# Patient Record
Sex: MALE | Race: WHITE | Employment: FULL TIME | URBAN - METROPOLITAN AREA
[De-identification: names, ages, dates, MRNs, and addresses within clinical notes are randomized per-mention and may not be internally consistent; named-entity substitution may affect disease eponyms.]

---

## 2017-12-04 ENCOUNTER — OFFICE VISIT (OUTPATIENT)
Dept: INTERNAL MEDICINE CLINIC | Age: 47
End: 2017-12-04

## 2017-12-04 VITALS
BODY MASS INDEX: 30.08 KG/M2 | DIASTOLIC BLOOD PRESSURE: 86 MMHG | HEART RATE: 50 BPM | WEIGHT: 260 LBS | TEMPERATURE: 97.9 F | HEIGHT: 78 IN | OXYGEN SATURATION: 98 % | SYSTOLIC BLOOD PRESSURE: 136 MMHG

## 2017-12-04 DIAGNOSIS — J02.9 SORE THROAT: Primary | ICD-10-CM

## 2017-12-04 PROCEDURE — 99213 OFFICE O/P EST LOW 20 MIN: CPT | Performed by: INTERNAL MEDICINE

## 2017-12-04 RX ORDER — AMOXICILLIN 250 MG/1
250 CAPSULE ORAL 3 TIMES DAILY
Qty: 30 CAPSULE | Refills: 0 | Status: SHIPPED | OUTPATIENT
Start: 2017-12-04 | End: 2017-12-14

## 2017-12-04 ASSESSMENT — ENCOUNTER SYMPTOMS
SORE THROAT: 1
RESPIRATORY NEGATIVE: 1
EYES NEGATIVE: 1
GASTROINTESTINAL NEGATIVE: 1

## 2017-12-04 NOTE — PROGRESS NOTES
Subjective:      Patient ID: Abiel Muñoz is a 41 y.o. male. HPI         Here 3.5 years ago       Sore throat x 2 weeks    Worried abou strep    No fevers, chills,rigors  Or other signs of systemic illness   Besides what  is already noted. Then he says  Has    Some mild bronchitis    Review of Systems   Constitutional: Negative. HENT: Positive for sore throat. Eyes: Negative. Respiratory: Negative. Cardiovascular: Negative. Gastrointestinal: Negative. Endocrine: Negative. Objective:   Physical Exam   Constitutional: He appears well-developed and well-nourished. HENT:   Head: Normocephalic and atraumatic. Eyes: Conjunctivae and EOM are normal.   Cardiovascular: Normal rate, regular rhythm and normal heart sounds. Pulmonary/Chest: Effort normal. No respiratory distress. He has no wheezes. He has no rales. Musculoskeletal: He exhibits no edema. Vitals reviewed. Assessment:      Phoebe Heck was seen today for pharyngitis. Diagnoses and all orders for this visit:    Sore throat         Continue with rest, fluids and tylenol as needed     Culture and amoxil     chloreseptoic    probb  viral      Plan: Ilsa Clarke

## 2017-12-06 LAB — THROAT CULTURE: NORMAL

## 2017-12-22 ENCOUNTER — OFFICE VISIT (OUTPATIENT)
Dept: INTERNAL MEDICINE CLINIC | Age: 47
End: 2017-12-22

## 2017-12-22 VITALS
OXYGEN SATURATION: 98 % | HEIGHT: 78 IN | WEIGHT: 258.6 LBS | BODY MASS INDEX: 29.92 KG/M2 | SYSTOLIC BLOOD PRESSURE: 118 MMHG | HEART RATE: 61 BPM | DIASTOLIC BLOOD PRESSURE: 74 MMHG

## 2017-12-22 DIAGNOSIS — L98.9 LESION OF SKIN OF FACE: ICD-10-CM

## 2017-12-22 DIAGNOSIS — Z00.00 ANNUAL PHYSICAL EXAM: Primary | ICD-10-CM

## 2017-12-22 DIAGNOSIS — J06.9 UPPER RESPIRATORY TRACT INFECTION, UNSPECIFIED TYPE: ICD-10-CM

## 2017-12-22 DIAGNOSIS — Z23 NEEDS FLU SHOT: ICD-10-CM

## 2017-12-22 DIAGNOSIS — L98.9 CHEST SKIN LESION: ICD-10-CM

## 2017-12-22 PROCEDURE — 99396 PREV VISIT EST AGE 40-64: CPT | Performed by: INTERNAL MEDICINE

## 2017-12-22 ASSESSMENT — ENCOUNTER SYMPTOMS
SORE THROAT: 1
RESPIRATORY NEGATIVE: 1
EYES NEGATIVE: 1

## 2017-12-28 ENCOUNTER — INITIAL CONSULT (OUTPATIENT)
Dept: SURGERY | Age: 47
End: 2017-12-28

## 2017-12-28 ENCOUNTER — TELEPHONE (OUTPATIENT)
Dept: INTERNAL MEDICINE CLINIC | Age: 47
End: 2017-12-28

## 2017-12-28 ENCOUNTER — IMMUNIZATION (OUTPATIENT)
Dept: INTERNAL MEDICINE CLINIC | Age: 47
End: 2017-12-28

## 2017-12-28 VITALS
BODY MASS INDEX: 29.62 KG/M2 | WEIGHT: 256 LBS | HEIGHT: 78 IN | DIASTOLIC BLOOD PRESSURE: 72 MMHG | SYSTOLIC BLOOD PRESSURE: 114 MMHG

## 2017-12-28 DIAGNOSIS — L98.9 SKIN LESION OF BACK: ICD-10-CM

## 2017-12-28 DIAGNOSIS — Q82.8 ACCESSORY SKIN TAGS: Primary | ICD-10-CM

## 2017-12-28 DIAGNOSIS — Z23 NEED FOR INFLUENZA VACCINATION: Primary | ICD-10-CM

## 2017-12-28 DIAGNOSIS — L98.9 SKIN LESION OF CHEST WALL: ICD-10-CM

## 2017-12-28 LAB
A/G RATIO: 2 (ref 1.1–2.2)
ALBUMIN SERPL-MCNC: 4.8 G/DL (ref 3.4–5)
ALP BLD-CCNC: 103 U/L (ref 40–129)
ALT SERPL-CCNC: 20 U/L (ref 10–40)
ANION GAP SERPL CALCULATED.3IONS-SCNC: 13 MMOL/L (ref 3–16)
AST SERPL-CCNC: 17 U/L (ref 15–37)
BILIRUB SERPL-MCNC: 1 MG/DL (ref 0–1)
BUN BLDV-MCNC: 16 MG/DL (ref 7–20)
CALCIUM SERPL-MCNC: 9.4 MG/DL (ref 8.3–10.6)
CHLORIDE BLD-SCNC: 102 MMOL/L (ref 99–110)
CHOLESTEROL, TOTAL: 202 MG/DL (ref 0–199)
CO2: 28 MMOL/L (ref 21–32)
CREAT SERPL-MCNC: 0.8 MG/DL (ref 0.9–1.3)
GFR AFRICAN AMERICAN: >60
GFR NON-AFRICAN AMERICAN: >60
GLOBULIN: 2.4 G/DL
GLUCOSE BLD-MCNC: 83 MG/DL (ref 70–99)
HDLC SERPL-MCNC: 35 MG/DL (ref 40–60)
LDL CHOLESTEROL CALCULATED: 144 MG/DL
POTASSIUM SERPL-SCNC: 4 MMOL/L (ref 3.5–5.1)
SODIUM BLD-SCNC: 143 MMOL/L (ref 136–145)
TOTAL PROTEIN: 7.2 G/DL (ref 6.4–8.2)
TRIGL SERPL-MCNC: 114 MG/DL (ref 0–150)
VLDLC SERPL CALC-MCNC: 23 MG/DL

## 2017-12-28 PROCEDURE — 90471 IMMUNIZATION ADMIN: CPT | Performed by: INTERNAL MEDICINE

## 2017-12-28 PROCEDURE — 90686 IIV4 VACC NO PRSV 0.5 ML IM: CPT | Performed by: INTERNAL MEDICINE

## 2017-12-28 PROCEDURE — 11200 RMVL SKIN TAGS UP TO&INC 15: CPT | Performed by: SURGERY

## 2017-12-28 ASSESSMENT — ENCOUNTER SYMPTOMS
RESPIRATORY NEGATIVE: 1
GASTROINTESTINAL NEGATIVE: 1

## 2017-12-28 NOTE — PROGRESS NOTES
 Drug use: No    Sexual activity: Yes     Partners: Female     Other Topics Concern    Not on file     Social History Narrative    No narrative on file       Family History   Problem Relation Age of Onset    Breast Cancer Mother     Heart Disease Father     Cancer Father      lymphoma            Review of Systems   Constitutional: Negative. Respiratory: Negative. Cardiovascular: Negative. Gastrointestinal: Negative. Musculoskeletal: Negative. Skin: Negative. Hematological: Negative. All other systems reviewed and are negative. Objective:   Physical Exam   Constitutional: He is oriented to person, place, and time. He appears well-developed and well-nourished. No distress. HENT:   Head: Normocephalic and atraumatic. Right Ear: External ear normal.   Left Ear: External ear normal.   Nose: Nose normal.   Mouth/Throat: Oropharynx is clear and moist.   Eyes: Conjunctivae are normal. No scleral icterus. Neck: Normal range of motion. Neck supple. Cardiovascular: Normal rate, regular rhythm and normal heart sounds. Pulmonary/Chest: Effort normal and breath sounds normal. No respiratory distress. He has no wheezes. Abdominal: Soft. He exhibits no distension. Musculoskeletal: Normal range of motion. He exhibits no edema. Neurological: He is alert and oriented to person, place, and time. Skin: Skin is warm and dry. He is not diaphoretic. No erythema. Two small 4 mm accessory skin lesions left posterior neck  1 fleshy skin lesions on stalk left axilla  3 fleshy skin lesions on stalk right axilla  1x 4 mm accessory skin tag left groin  2 x 3 mm accessory skin tags right gluteal cleft  1 cm flat, round pinkish lesion anterior chest and mid upper back   Psychiatric: He has a normal mood and affect. His behavior is normal. Judgment and thought content normal.       Assessment:      1. Accessory skin tags  31743 - LA REMOVAL OF SKIN TAGS, UP TO 15   2.  Skin lesion of chest wall

## 2018-01-03 ENCOUNTER — OFFICE VISIT (OUTPATIENT)
Dept: DERMATOLOGY | Age: 48
End: 2018-01-03

## 2018-01-03 DIAGNOSIS — L85.3 XEROSIS CUTIS: ICD-10-CM

## 2018-01-03 DIAGNOSIS — Z12.83 SCREENING EXAM FOR SKIN CANCER: ICD-10-CM

## 2018-01-03 DIAGNOSIS — D22.9 MULTIPLE BENIGN NEVI: Primary | ICD-10-CM

## 2018-01-03 DIAGNOSIS — D18.01 CHERRY ANGIOMA: ICD-10-CM

## 2018-01-03 DIAGNOSIS — R20.9 DISTURBANCE OF SKIN SENSATION: ICD-10-CM

## 2018-01-03 PROCEDURE — 11311 SHAVE SKIN LESION 0.6-1.0 CM: CPT | Performed by: DERMATOLOGY

## 2018-01-03 PROCEDURE — 99243 OFF/OP CNSLTJ NEW/EST LOW 30: CPT | Performed by: DERMATOLOGY

## 2018-01-03 NOTE — PROGRESS NOTES
[facility]) Dermatology  Maykel Torres[address]      Sanjiv Velarde  1970    41 y.o. male     Date of Visit: 1/3/2018    Chief Complaint / Reason for Referral: Lesion     I was asked to see this patient by Dr. Neftaly Brennan. History of Present Illness:  1. Many year history of multiple nevi on the trunk and extremities. Denies any recent new lesions. Denies any changes in size, color or shape. Denies any associated pain, pruritus or bleeding.    -Personal hx of melanoma: negative  -Personal hx of dysplastic nevi: negative  -Family hx of Melanoma: negative  -Family hx of dysplastic nevi: negative    -Hx of extensive sun exposure, blistering sunburns: negative  -Tanning bed use: negative  -Occupation: Indoor  -Uses sunscreen and/or wears protective clothing: Not regularly     2. Several yr history of persistent increasing in number asymptomatic lesions on trunk. No assoc pain or bleeding.    -Complains of 1 similar lesion on R temple that frequently bleeds when he accidentally nicks it shaving     3. Complains of dry and mildly pruritic skin on lower legs  -Showers w/ Dove all over. Does not use lotion     Review of Systems:  Constitutional: Reports general sense of well-being. Heme: Denies abnormal bleeding/bruising. Past Medical History, Surgical History, Family History, Medications and Allergies reviewed. History reviewed. No pertinent past medical history. Past Surgical History:   Procedure Laterality Date    ANTERIOR CRUCIATE LIGAMENT REPAIR Right 1993    KNEE SURGERY Right 1994    Right       Allergies   Allergen Reactions    Aspirin      Childhood allergy (tolerates motrin)    Morphine Sulfate      Red rash up his arm when injected     Outpatient Prescriptions Marked as Taking for the 1/3/18 encounter (Office Visit) with Miguel Pastrana MD   Medication Sig Dispense Refill    Acetaminophen (TYLENOL PO) Take  by mouth. Social History:  . 4 children.      Physical

## 2018-01-08 ENCOUNTER — TELEPHONE (OUTPATIENT)
Dept: DERMATOLOGY | Age: 48
End: 2018-01-08

## 2018-12-19 ENCOUNTER — OFFICE VISIT (OUTPATIENT)
Dept: DERMATOLOGY | Age: 48
End: 2018-12-19
Payer: COMMERCIAL

## 2018-12-19 DIAGNOSIS — D48.5 NEOPLASM OF UNCERTAIN BEHAVIOR OF SKIN: ICD-10-CM

## 2018-12-19 DIAGNOSIS — D22.9 MULTIPLE BENIGN NEVI: Primary | ICD-10-CM

## 2018-12-19 DIAGNOSIS — Z12.83 SCREENING EXAM FOR SKIN CANCER: ICD-10-CM

## 2018-12-19 PROCEDURE — 99213 OFFICE O/P EST LOW 20 MIN: CPT | Performed by: DERMATOLOGY

## 2018-12-19 PROCEDURE — 11100 PR BIOPSY OF SKIN LESION: CPT | Performed by: DERMATOLOGY

## 2018-12-21 LAB — DERMATOLOGY PATHOLOGY REPORT: NORMAL

## 2019-08-26 ENCOUNTER — OFFICE VISIT (OUTPATIENT)
Dept: ORTHOPEDIC SURGERY | Age: 49
End: 2019-08-26
Payer: COMMERCIAL

## 2019-08-26 VITALS
HEIGHT: 78 IN | SYSTOLIC BLOOD PRESSURE: 124 MMHG | BODY MASS INDEX: 29.5 KG/M2 | WEIGHT: 255 LBS | HEART RATE: 61 BPM | DIASTOLIC BLOOD PRESSURE: 63 MMHG

## 2019-08-26 DIAGNOSIS — M25.561 ACUTE PAIN OF RIGHT KNEE: Primary | ICD-10-CM

## 2019-08-26 DIAGNOSIS — M17.11 ARTHRITIS OF RIGHT KNEE: ICD-10-CM

## 2019-08-26 PROCEDURE — 20610 DRAIN/INJ JOINT/BURSA W/O US: CPT | Performed by: ORTHOPAEDIC SURGERY

## 2019-08-26 PROCEDURE — 99203 OFFICE O/P NEW LOW 30 MIN: CPT | Performed by: ORTHOPAEDIC SURGERY

## 2019-08-26 RX ORDER — METHYLPREDNISOLONE ACETATE 40 MG/ML
80 INJECTION, SUSPENSION INTRA-ARTICULAR; INTRALESIONAL; INTRAMUSCULAR; SOFT TISSUE ONCE
Status: COMPLETED | OUTPATIENT
Start: 2019-08-26 | End: 2019-08-26

## 2019-08-26 RX ADMIN — METHYLPREDNISOLONE ACETATE 80 MG: 40 INJECTION, SUSPENSION INTRA-ARTICULAR; INTRALESIONAL; INTRAMUSCULAR; SOFT TISSUE at 10:21

## 2019-08-26 ASSESSMENT — ENCOUNTER SYMPTOMS
RESPIRATORY NEGATIVE: 1
GASTROINTESTINAL NEGATIVE: 1
EYES NEGATIVE: 1
ALLERGIC/IMMUNOLOGIC NEGATIVE: 1

## 2019-08-26 NOTE — PROGRESS NOTES
Subjective:      Patient ID: Laura Castellanos is a 57 y.o. male. NOHEMI Castellanos presents today for evaluation of his right knee. I saw him in 2014. He had a patellar tendon ACL reconstruction in about 1993. He has done very well from that. He typically plays basketball and volleyball. He wears a knee brace to play basketball but not to play sand volleyball. About 6 or 8 weeks ago he landed from a volleyball jump and began having some discomfort in the peripatellar region of the right knee. He has been limping some. He says he can do the elliptical for 15 minutes without pain. He says that his lymph improves the more he walks. He is not currently taking medicine. He is a  and  in information systems. Review of Systems   Constitutional: Negative. HENT: Negative. Eyes: Negative. Respiratory: Negative. Cardiovascular: Negative. Gastrointestinal: Negative. Endocrine: Negative. Genitourinary: Negative. Musculoskeletal: Positive for arthralgias and gait problem. Negative for joint swelling. Right knee pain   Skin: Negative. Allergic/Immunologic: Negative. Hematological: Negative. Psychiatric/Behavioral: Negative. Objective:   Physical Exam  General Exam:    Vitals: Blood pressure 124/63, pulse 61, height 6' 10\" (2.083 m), weight 255 lb (115.7 kg). Constitutional: Patient is adequately groomed with no evidence of malnutrition  Mental Status: The patient is oriented to time, place and person. The patient's mood and affect are appropriate. Gait:  Patient walks with some antalgia in his gait. Lymphatic: The lymphatic examination bilaterally reveals all areas to be without enlargement or induration. Vascular: Examination reveals no swelling or calf tenderness. Peripheral pulses are palpable and 2+. Neurological: The patient has good coordination. There is no weakness or sensory deficit.     Skin:    Head/Neck: inspection

## 2020-09-21 ENCOUNTER — OFFICE VISIT (OUTPATIENT)
Dept: PRIMARY CARE CLINIC | Age: 50
End: 2020-09-21
Payer: COMMERCIAL

## 2020-09-21 PROCEDURE — 99211 OFF/OP EST MAY X REQ PHY/QHP: CPT | Performed by: NURSE PRACTITIONER

## 2020-09-21 NOTE — PROGRESS NOTES
Jenaro Roberts received a viral test for COVID-19. They were educated on isolation and quarantine as appropriate. For any symptoms, they were directed to seek care from their PCP, given contact information to establish with a doctor, directed to an urgent care or the emergency room.

## 2020-09-22 LAB — SARS-COV-2, NAA: NOT DETECTED

## 2021-07-22 ENCOUNTER — TELEPHONE (OUTPATIENT)
Dept: ORTHOPEDIC SURGERY | Age: 51
End: 2021-07-22

## 2021-07-22 ENCOUNTER — OFFICE VISIT (OUTPATIENT)
Dept: ORTHOPEDIC SURGERY | Age: 51
End: 2021-07-22
Payer: COMMERCIAL

## 2021-07-22 VITALS
BODY MASS INDEX: 29.39 KG/M2 | SYSTOLIC BLOOD PRESSURE: 127 MMHG | RESPIRATION RATE: 12 BRPM | HEART RATE: 69 BPM | DIASTOLIC BLOOD PRESSURE: 81 MMHG | WEIGHT: 254 LBS | HEIGHT: 78 IN

## 2021-07-22 DIAGNOSIS — M25.461 EFFUSION OF RIGHT KNEE: ICD-10-CM

## 2021-07-22 DIAGNOSIS — M25.561 ACUTE PAIN OF RIGHT KNEE: Primary | ICD-10-CM

## 2021-07-22 DIAGNOSIS — M17.11 ARTHRITIS OF RIGHT KNEE: ICD-10-CM

## 2021-07-22 PROCEDURE — 20610 DRAIN/INJ JOINT/BURSA W/O US: CPT | Performed by: ORTHOPAEDIC SURGERY

## 2021-07-22 PROCEDURE — 99214 OFFICE O/P EST MOD 30 MIN: CPT | Performed by: ORTHOPAEDIC SURGERY

## 2021-07-22 RX ORDER — METHYLPREDNISOLONE ACETATE 40 MG/ML
80 INJECTION, SUSPENSION INTRA-ARTICULAR; INTRALESIONAL; INTRAMUSCULAR; SOFT TISSUE ONCE
Status: COMPLETED | OUTPATIENT
Start: 2021-07-22 | End: 2021-07-22

## 2021-07-22 RX ORDER — LANOLIN ALCOHOL/MO/W.PET/CERES
3 CREAM (GRAM) TOPICAL NIGHTLY PRN
COMMUNITY
End: 2021-11-11

## 2021-07-22 RX ORDER — LIDOCAINE HYDROCHLORIDE 10 MG/ML
3 INJECTION, SOLUTION INFILTRATION; PERINEURAL ONCE
Status: COMPLETED | OUTPATIENT
Start: 2021-07-22 | End: 2021-07-22

## 2021-07-22 RX ADMIN — METHYLPREDNISOLONE ACETATE 80 MG: 40 INJECTION, SUSPENSION INTRA-ARTICULAR; INTRALESIONAL; INTRAMUSCULAR; SOFT TISSUE at 10:32

## 2021-07-22 RX ADMIN — LIDOCAINE HYDROCHLORIDE 3 ML: 10 INJECTION, SOLUTION INFILTRATION; PERINEURAL at 10:32

## 2021-07-22 ASSESSMENT — ENCOUNTER SYMPTOMS
RESPIRATORY NEGATIVE: 1
EYES NEGATIVE: 1
GASTROINTESTINAL NEGATIVE: 1
ALLERGIC/IMMUNOLOGIC NEGATIVE: 1

## 2021-07-22 NOTE — TELEPHONE ENCOUNTER
Appointment Request     Patient requesting earlier appointment: Yes  Appointment offered to patient: yes Monday    Pt is calling to see if he can come in today instead of Monday.     Patient Contact Number: [phone]

## 2021-07-22 NOTE — PROGRESS NOTES
Subjective:      Patient ID: Faith Nicholson is a 52 y.o. male. HPI  Faith Nicholson is seen today for evaluation of an acute onset of right knee pain. 2 years ago he had a cortisone injection and did well until last week. He rolled over in bed and felt a pop and began having pain and swelling. He feels tight and doesn't feel like he can fully extend his knee. He is status post ACL reconstruction performed in about 1993. 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 Review of Systems   Constitutional: Negative. HENT: Negative. Eyes: Negative. Respiratory: Negative. Cardiovascular: Negative. Gastrointestinal: Negative. Endocrine: Negative. Genitourinary: Negative. Musculoskeletal: Negative. Skin: Negative. Allergic/Immunologic: Negative. Neurological: Negative. Hematological: Negative. Psychiatric/Behavioral: Negative. Objective:   Physical Exam  History: Patient's relevant past family, medical, and social history are reviewed as part of today's visit. ROS of pertinent positives and negatives as above; otherwise negative. General Exam:    Vitals: Blood pressure 127/81, pulse 69, resp. rate 12, height 6' 10\" (2.083 m), weight 254 lb (115.2 kg). Constitutional: Patient is adequately groomed with no evidence of malnutrition  Mental Status: The patient is oriented to time, place and person. The patient's mood and affect are appropriate. Gait:  Patient walks with normal gait and station. Lymphatic: The lymphatic examination bilaterally reveals all areas to be without enlargement or induration. Vascular: Examination reveals no swelling or calf tenderness. Peripheral pulses are palpable and 2+. Neurological: The patient has good coordination. There is no weakness or sensory deficit. Skin:    Head/Neck: inspection reveals no rashes, ulcerations or lesions. Trunk:  inspection reveals no rashes, ulcerations or lesions.   Right Lower Extremity: inspection reveals no rashes, ulcerations or lesions. Left Lower Extremity: inspection reveals no rashes, ulcerations or lesions. Left knee has mild crepitation but no instability or effusion. He has full range of motion. Right knee has a moderate effusion with moderate crepitation. No significant joint line tenderness is noted. Range of motion five to about 115 degrees. Lachman shows mildly increased translation but no profound instability. X-rays were obtained today in the office and interpreted by me. AP standing, PA flexed, and merchant views of the bilateral knees as well as a lateral of the right knee. These demonstrate: Tricompartmental degenerative change worse the patellofemoral joint of the right knee. He status post ACL reconstruction. There has not been appreciable change since 2019. There is some calcification in the quad tendon noted. Assessment:      Exacerbation of right knee arthritis with effusion      Plan:      Right knee aspiration and injection. Procedure: Under sterile technique, right knee was anesthetized in the suprapatellar pouch. An aspirate the right knee of about 45 cc of serous fluid. It decompressed nicely. Right knee was injected with 80 mg of Depo-Medrol. He tolerated that well. He will follow up with me on an as-needed basis. This note was created using voice recognition software. It has been proofread, but occasionally errors remain. Please disregard these errors. They will be corrected as they are noted.

## 2021-08-06 ENCOUNTER — OFFICE VISIT (OUTPATIENT)
Dept: ORTHOPEDIC SURGERY | Age: 51
End: 2021-08-06
Payer: COMMERCIAL

## 2021-08-06 VITALS
SYSTOLIC BLOOD PRESSURE: 121 MMHG | DIASTOLIC BLOOD PRESSURE: 74 MMHG | BODY MASS INDEX: 29.39 KG/M2 | HEIGHT: 78 IN | HEART RATE: 83 BPM | WEIGHT: 254 LBS

## 2021-08-06 DIAGNOSIS — M25.461 EFFUSION OF RIGHT KNEE: ICD-10-CM

## 2021-08-06 DIAGNOSIS — M25.561 ACUTE PAIN OF RIGHT KNEE: Primary | ICD-10-CM

## 2021-08-06 DIAGNOSIS — S83.241A ACUTE MEDIAL MENISCUS TEAR, RIGHT, INITIAL ENCOUNTER: ICD-10-CM

## 2021-08-06 PROCEDURE — 99212 OFFICE O/P EST SF 10 MIN: CPT | Performed by: ORTHOPAEDIC SURGERY

## 2021-08-10 ENCOUNTER — OFFICE VISIT (OUTPATIENT)
Dept: ORTHOPEDIC SURGERY | Age: 51
End: 2021-08-10
Payer: COMMERCIAL

## 2021-08-10 VITALS
BODY MASS INDEX: 29.39 KG/M2 | HEIGHT: 78 IN | SYSTOLIC BLOOD PRESSURE: 122 MMHG | WEIGHT: 254 LBS | HEART RATE: 64 BPM | DIASTOLIC BLOOD PRESSURE: 76 MMHG | RESPIRATION RATE: 12 BRPM

## 2021-08-10 DIAGNOSIS — M25.561 ACUTE PAIN OF RIGHT KNEE: ICD-10-CM

## 2021-08-10 DIAGNOSIS — M25.461 EFFUSION OF RIGHT KNEE: Primary | ICD-10-CM

## 2021-08-10 DIAGNOSIS — M17.11 ARTHRITIS OF RIGHT KNEE: ICD-10-CM

## 2021-08-10 PROCEDURE — 99212 OFFICE O/P EST SF 10 MIN: CPT | Performed by: ORTHOPAEDIC SURGERY

## 2021-08-10 NOTE — PROGRESS NOTES
Baron Jama turns today for his right knee. He does not have much pain at rest but it is more intense and can be as bad as 6 out of 10 intermittently. We obtained his MRI. Patient's medications, allergies, past medical, surgical, social and family histories were reviewed and updated as appropriate. Relevant review of systems reviewed. General Exam:    Vitals: Blood pressure 122/76, pulse 64, resp. rate 12, height 6' 10\" (2.083 m), weight 254 lb (115.2 kg). Constitutional: Patient is adequately groomed with no evidence of malnutrition  Mental Status: The patient is oriented to time, place and person. The patient's mood and affect are appropriate. Right knee has a moderate effusion. He has medial and lateral joint line pain with moderate crepitation. Lachman shows increased anterior translation. Range of motion 0 to 130 degrees. MRI scan right knee is reviewed. It demonstrates:       FINDINGS:  Intact quadriceps.  Scarred patella tendon related to previous autograft.  Complex    tear posterior horn, body and anterior horn medial meniscus.  Portion of the meniscus displaced    adjacent to the posterior horn root.  Medial capsular swelling.  MCL intact.  Medial    compartment arthropathy.  Intermediate grade chondromalacia medial femoral condyle and tibia.     Subcortical microfracturing and pseudocysts medial tibia.       Complex tear posterior horn and body lateral meniscus.  The posterior horn root truncated.  The    anterior horn root frayed.  LCL intact.  Lateral compartment arthropathy.  Cartilage ulcers    posterolateral femoral condyle and tibia.  Marrow reaction.       Abnormal ACL graft favored to be chronic and completely torn.  Portion of the bone plug within    the notch.  PCL intact.       Complex effusion.  Synovitic reaction.  Debris and loose bodies.  Strained popliteus muscle.     Small Baker's cyst.       CONCLUSION:   1. Complex medial and lateral meniscus tears.  Portions of both menisci are macerated. Posterior    horn root medial meniscus displaced. The midbody extruded. 2. Tricompartment arthropathy. Regions of diffuse intermediate grade chondromalacia. Active    microfracturing and pseudocysts medial tibia. Mild marrow reaction posterolateral femoral    condyle and tibia and anterolateral tibia. 3. Chronic completely torn ACL graft. Fibers inconspicuous. Portion of the bone plug displaced    centrally within the notch. The PCL is buckled. 4. Complex effusion. Complex synovitic reaction. Debris and loose bodies. 5. Please see above.         I reviewed these findings on the report and the images with the patient. Assessment: Unfortunately his MRI shows much more profound arthritic change that I was anticipating. Plan: We discussed his options at length. In my opinion arthroscopy would not provide him durable relief given the significant arthritic change, ACL deficiency, and meniscal pathology. We discussed viscosupplementation but this is not covered by his insurance. I am asking him to have at least a consultation with one of our arthroplasty specialist and we will facilitate that. He agrees. All questions have been answered. This note was created using voice recognition software. It has been proofread, but occasionally errors remain. Please disregard these errors. They will be corrected as they are noted.

## 2021-08-12 ENCOUNTER — OFFICE VISIT (OUTPATIENT)
Dept: ORTHOPEDIC SURGERY | Age: 51
End: 2021-08-12
Payer: COMMERCIAL

## 2021-08-12 ENCOUNTER — PREP FOR PROCEDURE (OUTPATIENT)
Dept: ORTHOPEDIC SURGERY | Age: 51
End: 2021-08-12

## 2021-08-12 VITALS — HEIGHT: 78 IN | BODY MASS INDEX: 29.39 KG/M2 | WEIGHT: 254 LBS

## 2021-08-12 DIAGNOSIS — M17.11 PRIMARY OSTEOARTHRITIS OF RIGHT KNEE: Primary | ICD-10-CM

## 2021-08-12 PROCEDURE — 99213 OFFICE O/P EST LOW 20 MIN: CPT | Performed by: ORTHOPAEDIC SURGERY

## 2021-08-12 NOTE — PROGRESS NOTES
that is well-healed. Examination reveals that knee range of motion is 0 to 130 degrees. There is mild crepitus, positive joint line tenderness, positive antalgic gait. Neurologically, plantar flexion and dorsiflexion is intact. Pulses palpable distally. 5/5 strength. Imaging:  Prior right knee radiographs were reviewed and are significant for ACL reconstruction screws in place. There is joint space narrowing tricompartmentally. Right knee MRI was reviewed and shows degenerative medial and lateral meniscus tears. There is no partial ACL. He also has tricompartmental chondromalacia. Assessment:  Posttraumatic right knee osteoarthritis    Plan:  We discussed the diagnosis and treatment options. He has been treated in the past with NSAIDs, injections, physical therapy. He continues to have knee discomfort on a daily basis. We discussed right total knee arthroplasty. The operative procedure, alternatives, and risks were discussed in detail with the patient. The risks include but are not limited to: Infection, vessel injury, nerve injury, DVT, pulmonary embolism, implant loosening, need for revision surgery, loss of motion, continued pain. Despite these risks the patient would like to proceed. All questions have been answered and no guarantees have been made. The patient is unable to do further physical therapy due to disabling pain. I discussed with the patient the diagnosis in detail and answered all the questions. The patient verbalized understanding of the plan as it has been described above and is in agreement. He plans to seek a second opinion. However, it seems likely he wants to proceed with right total knee arthroplasty. I did recommend waiting at least 3 months from his recent steroid injection, making surgery in late October at the earliest.    Total time spent on today's encounter was at least 28 minutes.  This time included reviewing prior notes, radiographs, and lab results when available, reviewing history obtained by medical assistant, performing history and physical exam, reviewing tests/radiographs with the patient, counseling the patient, ordering medications or tests, documentation in the electronic health record, and coordination of care. This dictation was done with Dragon dictation and may contain mechanical errors related to translation.

## 2021-08-12 NOTE — LETTER
[address] [facility]  Surgery Scheduling Form:  [facility]    DEMOGRAPHICS:                                                                                                                Patient Name:  Faith Nicholson  Patient :  1970   Patient SS#:      Patient Phone:  [phone] (home)                            Patient Address:  [address] [place] [id number]    PCP:  Mohsen Sanchez  Insurance:  Payor: Malena Hess / Plan: Annabelle Green PPO / Product Type: *No Product type* /   Insurance ID Number:    DIAGNOSIS & PROCEDURE:                                                                                              Diagnosis:     Right arthritis knee     Operation:  Right Total Knee Replacement robotic assisted  Location:  [facility]  Surgeon:  Sherren Portal, MD    SCHEDULING INFORMATION:                                                                                         .  Surgeon's Scheduling Instruction:  November, Patient will call    Requested Date:    OR Time:   Patient Arrival Time:    OR TIME REQUIRED  :  90 MIN  Anesthesia:  Choice  Equipment: [place] Colon, advanced                COVID:    Mini C-Arm:  No   Standard C-Arm:  No  Status:  same day admit  PAT Required:  Yes  Comments: NO femoral nerve block   ALLERGIES:Aspirin and Morphine sulfate                    Radha Harrison MD      21 8:46 AM  BILLING INFORMATION:                                                                                                     Procedure:       CPT Code Modifier    With WARD Dean  [address]                                                H&P AT:       PCP  XX                      URGENT CARE                    James Mckeon    TOTAL KNEE REPLACEMENT   1970     PHYSICIANS ORDERS    HEIGHT:  Ht Readings from Last 1 Encounters:   21 6' 10\" (2.083 m)               WEIGHT:  Wt Readings from Last 1 Encounters:   21 254 lb (115.2 kg)         ALLERGIES:Aspirin and Morphine sulfate                           SURG                                      __________________________________________________________________  PRE-OP ORDERS:  ? CBC WITH DIFFERENTIAL                                                ? TYPE AND SCREEN                                                            ? HgB A1C                                                                               ? EKG                                                                                        ? NASAL CULUTRE MRSA  ? UAR/if positive repeat UAR on admission  ? BMP           ALBUMIN AND PREALBUMIN           VITAMIN D LEVELS  ? COAG PROFILE  ? SED RATE  ? PT/OT EVAL AND TEACHING  ? INSTRUCT PT TO STOP ALL NSAIDS, ASPIRIN, BLOOD THINNERS 7 DAYS PRIOR SURGERY  DAY OF SURGERY  ? CEFAZOLIN 2 GM IVPB; IF PATIENT WEIGHS > 80 KG AND SERUM CREATININE <2.5 mg/dl, GIVE 2 GM DOSE WITHIN 1 HOUR OF INCISION. ? IF THE PRE-OP NASAL CULTURE FOR MRSA WAS POSITIVE:   REPEAT NASAL SWAB ON ADMISSION AND ADMINISTER VANCOMYCIN 15 mg x kg, REDUCE THE DOSE OF VANCOMYCIN  MG IVPB IF PT < 55 KG OR SERUM CREATININE > 2mg/dl; also to get Cefazolin 2 GM or wt based  ? All patients will receive preop Cefazolin 2 GM or wt based   ? APPLY KNEE HIGH ANTI-EMBOLIC AND PNEUMO-BOOTS TO UNOPERATIVE  LEG  ? MOBIC 7.5 MG  ORALLY  DAY OF SURGERY  ? ROXICODONE 10MG  ORALLY DAY OF SURGERY  ? TRANEXAMIC ACID 1950 MG 2 HOURS PRIOR TO SURGERY  ? TYPE AND SCREEN  OTHER ORDERS:_______________________________________________________PHYSICIAN SIGNATURE: __   8/12/21                                                                                                       8:46 AM  ________________________DATE:                          Supplemental Confidentiality & Payment Agreement & Supplemental HIPAA Notice for Shared Medical Appointments        During shared medical appointments, you will hear about other participants health issues and personal information.   As a matter of trust, it is your duty to keep everything you hear confidential.  Nothing that identifies a participant in any way (including job, ethnicity, [employer], etc.) can be shared outside of this group setting. I have read and agree to the following statements:        · I agree to meet with a group of patients and my doctor. I understand that I have the choice to be seen by my physician in this group or individually and that I may leave the group visit anytime I feel uncomfortable. · I understand that discussions will occur regarding individual identifiable health information during the shared medical appointment and I will maintain the confidentiality of [facility] health information or other information heard during the appointment. · I am committed to maintaining this confidentiality even if I am no longer participating in shared medical appointments. · I understand that the information I share with the physician and staff will be heard by the other participants and there is a possibility that the information disclosed in the shared medical appointment may be re-disclosed by other participants. I have been notified of this potential disclosure and I voluntarily agree to participate in the shared medical appointment. · I know that I dont have to share any personal information with the group or health care providers unless, I choose to do so. · Like any doctors appointment, I agree to be responsible for the bill and / or co-payment associated with this physician appointment. Shared Medical Appointment              THIS SUPPLEMENTAL NOTICE SUPPLEMENTS AND DOES NOT REPLACE THE [facility] CONSENT, PATIENT RESPONSIBILITY AND NOTICE OF PRIVACY PRACTICE.         Caterina Rich    1970        Patients Signature: ____________________________________________________         DATE: ____/____/___      Alexx Days / Support Persons Signature (if applicable) _________________________ DATE: __/__/__    **Each person will be asked to sign this commitment before each Shared Medical Appointment. Thank You ! SURGERY SCHEDULING TIMES                                                                                                                                                      Carolina Bui                                                                                       1970                                                                           SURGERY DATE: ___/___/___                                                                      SURGERY TIME:  ___:___ AM/PM                                                                      ARRIVAL TIME:   ___:___  AM/PM                                                                                                                        **PLEASE REPORT TO THE                                                       INFORMATION DESK IN THE MAIN LOBBY                                                          OF THE HOSPITAL.         [facility]                           JET INFO     PT NAME:  Carolina Mckeon              Patient Phone:  [phone] (home)                                           1970    PCP:  PCP:  Nabeel Brown                APPT DATE:  ___/___/___      DATE:  21        H&P __________    LABS: _________                      NEEDED:  __________    EKG:   _________                     NEEDED:  __________      JET DATE:   _______/_______      SURG DATE:   ________/________

## 2021-08-24 ENCOUNTER — TELEPHONE (OUTPATIENT)
Dept: ORTHOPEDIC SURGERY | Age: 51
End: 2021-08-24

## 2021-08-24 NOTE — TELEPHONE ENCOUNTER
General Question     Subject: SURGERY QUESTIONS  Patient and /or Facility Request: PATIENT WOULD LIKE TO KNOW RECOVERY TIME, HOW LONG AFTER  CORTISONE INJ WILL HE PERFORM SX.  PATIENT WANTS TO BE SURE THAT HARDWARE FOR HIS SURGERY HAS BEEN RECEIVED SINCE HE IS REALLY TALL HE IS NEEDING EXTENDED HARDWARE  Contact Number: [phone]

## 2021-11-11 ENCOUNTER — OFFICE VISIT (OUTPATIENT)
Dept: ORTHOPEDIC SURGERY | Age: 51
End: 2021-11-11
Payer: COMMERCIAL

## 2021-11-11 ENCOUNTER — TELEPHONE (OUTPATIENT)
Dept: ORTHOPEDIC SURGERY | Age: 51
End: 2021-11-11

## 2021-11-11 VITALS — HEIGHT: 78 IN | WEIGHT: 254 LBS | BODY MASS INDEX: 29.39 KG/M2

## 2021-11-11 DIAGNOSIS — M65.351 TRIGGER FINGER, RIGHT LITTLE FINGER: Primary | ICD-10-CM

## 2021-11-11 PROCEDURE — 99214 OFFICE O/P EST MOD 30 MIN: CPT | Performed by: ORTHOPAEDIC SURGERY

## 2021-11-11 PROCEDURE — 20550 NJX 1 TENDON SHEATH/LIGAMENT: CPT | Performed by: ORTHOPAEDIC SURGERY

## 2021-11-11 RX ORDER — METHYLPREDNISOLONE ACETATE 40 MG/ML
40 INJECTION, SUSPENSION INTRA-ARTICULAR; INTRALESIONAL; INTRAMUSCULAR; SOFT TISSUE ONCE
Status: COMPLETED | OUTPATIENT
Start: 2021-11-11 | End: 2021-11-11

## 2021-11-11 RX ORDER — LIDOCAINE HYDROCHLORIDE 10 MG/ML
0.5 INJECTION, SOLUTION INFILTRATION; PERINEURAL ONCE
Status: COMPLETED | OUTPATIENT
Start: 2021-11-11 | End: 2021-11-11

## 2021-11-11 RX ORDER — MULTIVIT WITH MINERALS/LUTEIN
1000 TABLET ORAL DAILY
COMMUNITY

## 2021-11-11 RX ADMIN — METHYLPREDNISOLONE ACETATE 40 MG: 40 INJECTION, SUSPENSION INTRA-ARTICULAR; INTRALESIONAL; INTRAMUSCULAR; SOFT TISSUE at 10:59

## 2021-11-11 RX ADMIN — LIDOCAINE HYDROCHLORIDE 0.5 ML: 10 INJECTION, SOLUTION INFILTRATION; PERINEURAL at 10:57

## 2021-11-11 NOTE — TELEPHONE ENCOUNTER
.Same Day Appt Add On Time     Appointment time:  2:30    PATIENT IS A FRIEND OF DR. ADAMS.  HE SPOKE TO DR. Maggie Pabon, AND WAS TOLD TO COME INTO THE OFFICE TO GET A CORTISONE INJECTION.

## 2021-11-11 NOTE — PROGRESS NOTES
Kailey Smith seen today for evaluation of a trigger finger involving his right small finger. He has had intermittent symptoms for about a year. There are times when it catches and locks other times he can unlock it. He does not really experience much pain and certainly has no pain at rest.    He is status post right knee replacement and is doing well from that. 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 History: Patient's relevant past family, medical, and social history are reviewed as part of today's visit. ROS of pertinent positives and negatives as above; otherwise negative. General Exam:    Vitals: Height 6' 10\" (2.083 m), weight 254 lb (115.2 kg). Constitutional: Patient is adequately groomed with no evidence of malnutrition  Mental Status: The patient is oriented to time, place and person. The patient's mood and affect are appropriate. Gait:  Patient walks with normal gait and station. Lymphatic: The lymphatic examination bilaterally reveals all areas to be without enlargement or induration. Vascular: Examination reveals no swelling or calf tenderness. Peripheral pulses are palpable and 2+. Neurological: The patient has good coordination. There is no weakness or sensory deficit. Skin:    Head/Neck: inspection reveals no rashes, ulcerations or lesions. Trunk:  inspection reveals no rashes, ulcerations or lesions. Right Lower Extremity: inspection reveals no rashes, ulcerations or lesions. Left Lower Extremity: inspection reveals no rashes, ulcerations or lesions. Left hand exam is normal.    On the right side he has a palpable trigger at the metacarpal phalangeal joint involving the left small finger. It is easily reducible. Flexion extensor tendons are intact. Neurologic and vascular exams are normal.    3 view x-rays right small finger obtained today in the office and interpreted by me demonstrate: No bony abnormalities.       Assessment: Right small finger trigger finger. Plan: We will proceed with an injection today. Procedure: Under sterile technique, 25-gauge needle was introduced at the level of the A1 pulley just proximal to the MCP. Intratendinous sheath placement was confirmed with motion of the DIP moving the needle. The tendon sheath was then injected with 40 mg of Depo-Medrol and 5 mg of lidocaine. He tolerated that well. Follow-up on an as-needed basis.

## 2025-02-21 ENCOUNTER — TELEPHONE (OUTPATIENT)
Dept: ORTHOPEDIC SURGERY | Age: 55
End: 2025-02-21

## 2025-02-21 NOTE — TELEPHONE ENCOUNTER
Called and spoke with patient. He states that he needs a note for [employer] based on a policy that his job made years ago \"so that he does not get smushed\".  I explained that Dr. Frank has not seen him since 2021 and would need to see him in the office before he would possibly write him a note. I explained that Dr. Frank would not see his right knee as he does not see knees that have been replaced. I recommended that the patient ask his primary care physician or his knee surgeon. Patient will ask one of his physicians that he has seen recently.

## 2025-02-21 NOTE — TELEPHONE ENCOUNTER
General Question     Subject: MEDICAL NOTE FOR [employer]  Patient and /or Facility Request: Fortunato Mckeon   Contact Number: [phone]      PATIENT REQUESTING A CALL BACK REGARDING A MEDICAL NOTE FOR [employer]    PLEASE CALL PATIENT AT THE ABOVE NUMBER.